# Patient Record
Sex: MALE | Race: BLACK OR AFRICAN AMERICAN | NOT HISPANIC OR LATINO | URBAN - METROPOLITAN AREA
[De-identification: names, ages, dates, MRNs, and addresses within clinical notes are randomized per-mention and may not be internally consistent; named-entity substitution may affect disease eponyms.]

---

## 2021-09-27 ENCOUNTER — EMERGENCY (EMERGENCY)
Facility: HOSPITAL | Age: 31
LOS: 1 days | Discharge: HOME | End: 2021-09-27
Attending: STUDENT IN AN ORGANIZED HEALTH CARE EDUCATION/TRAINING PROGRAM | Admitting: STUDENT IN AN ORGANIZED HEALTH CARE EDUCATION/TRAINING PROGRAM
Payer: MEDICAID

## 2021-09-27 VITALS
OXYGEN SATURATION: 97 % | DIASTOLIC BLOOD PRESSURE: 95 MMHG | RESPIRATION RATE: 18 BRPM | TEMPERATURE: 98 F | HEART RATE: 94 BPM | SYSTOLIC BLOOD PRESSURE: 136 MMHG

## 2021-09-27 VITALS
RESPIRATION RATE: 16 BRPM | TEMPERATURE: 98 F | DIASTOLIC BLOOD PRESSURE: 85 MMHG | OXYGEN SATURATION: 97 % | HEART RATE: 93 BPM | SYSTOLIC BLOOD PRESSURE: 143 MMHG

## 2021-09-27 LAB
ALBUMIN SERPL ELPH-MCNC: 4.1 G/DL — SIGNIFICANT CHANGE UP (ref 3.3–5)
ALP SERPL-CCNC: 53 U/L — SIGNIFICANT CHANGE UP (ref 40–120)
ALT FLD-CCNC: 36 U/L — SIGNIFICANT CHANGE UP (ref 4–41)
ANION GAP SERPL CALC-SCNC: 13 MMOL/L — SIGNIFICANT CHANGE UP (ref 7–14)
AST SERPL-CCNC: 21 U/L — SIGNIFICANT CHANGE UP (ref 4–40)
BASOPHILS # BLD AUTO: 0.03 K/UL — SIGNIFICANT CHANGE UP (ref 0–0.2)
BASOPHILS NFR BLD AUTO: 0.4 % — SIGNIFICANT CHANGE UP (ref 0–2)
BILIRUB SERPL-MCNC: 0.4 MG/DL — SIGNIFICANT CHANGE UP (ref 0.2–1.2)
BUN SERPL-MCNC: 7 MG/DL — SIGNIFICANT CHANGE UP (ref 7–23)
CALCIUM SERPL-MCNC: 9.2 MG/DL — SIGNIFICANT CHANGE UP (ref 8.4–10.5)
CHLORIDE SERPL-SCNC: 103 MMOL/L — SIGNIFICANT CHANGE UP (ref 98–107)
CO2 SERPL-SCNC: 25 MMOL/L — SIGNIFICANT CHANGE UP (ref 22–31)
CREAT SERPL-MCNC: 0.96 MG/DL — SIGNIFICANT CHANGE UP (ref 0.5–1.3)
EOSINOPHIL # BLD AUTO: 0.16 K/UL — SIGNIFICANT CHANGE UP (ref 0–0.5)
EOSINOPHIL NFR BLD AUTO: 2 % — SIGNIFICANT CHANGE UP (ref 0–6)
GLUCOSE SERPL-MCNC: 100 MG/DL — HIGH (ref 70–99)
HCT VFR BLD CALC: 45 % — SIGNIFICANT CHANGE UP (ref 39–50)
HGB BLD-MCNC: 15.2 G/DL — SIGNIFICANT CHANGE UP (ref 13–17)
IANC: 5.87 K/UL — SIGNIFICANT CHANGE UP (ref 1.5–8.5)
IMM GRANULOCYTES NFR BLD AUTO: 0.3 % — SIGNIFICANT CHANGE UP (ref 0–1.5)
LYMPHOCYTES # BLD AUTO: 1.32 K/UL — SIGNIFICANT CHANGE UP (ref 1–3.3)
LYMPHOCYTES # BLD AUTO: 16.5 % — SIGNIFICANT CHANGE UP (ref 13–44)
MCHC RBC-ENTMCNC: 30.3 PG — SIGNIFICANT CHANGE UP (ref 27–34)
MCHC RBC-ENTMCNC: 33.8 GM/DL — SIGNIFICANT CHANGE UP (ref 32–36)
MCV RBC AUTO: 89.8 FL — SIGNIFICANT CHANGE UP (ref 80–100)
MONOCYTES # BLD AUTO: 0.58 K/UL — SIGNIFICANT CHANGE UP (ref 0–0.9)
MONOCYTES NFR BLD AUTO: 7.3 % — SIGNIFICANT CHANGE UP (ref 2–14)
NEUTROPHILS # BLD AUTO: 5.87 K/UL — SIGNIFICANT CHANGE UP (ref 1.8–7.4)
NEUTROPHILS NFR BLD AUTO: 73.5 % — SIGNIFICANT CHANGE UP (ref 43–77)
NRBC # BLD: 0 /100 WBCS — SIGNIFICANT CHANGE UP
NRBC # FLD: 0 K/UL — SIGNIFICANT CHANGE UP
PLATELET # BLD AUTO: 223 K/UL — SIGNIFICANT CHANGE UP (ref 150–400)
POTASSIUM SERPL-MCNC: 3.4 MMOL/L — LOW (ref 3.5–5.3)
POTASSIUM SERPL-SCNC: 3.4 MMOL/L — LOW (ref 3.5–5.3)
PROT SERPL-MCNC: 7 G/DL — SIGNIFICANT CHANGE UP (ref 6–8.3)
RBC # BLD: 5.01 M/UL — SIGNIFICANT CHANGE UP (ref 4.2–5.8)
RBC # FLD: 12.8 % — SIGNIFICANT CHANGE UP (ref 10.3–14.5)
SODIUM SERPL-SCNC: 141 MMOL/L — SIGNIFICANT CHANGE UP (ref 135–145)
TROPONIN T, HIGH SENSITIVITY RESULT: <6 NG/L — SIGNIFICANT CHANGE UP
TSH SERPL-MCNC: 0.6 UIU/ML — SIGNIFICANT CHANGE UP (ref 0.27–4.2)
WBC # BLD: 7.98 K/UL — SIGNIFICANT CHANGE UP (ref 3.8–10.5)
WBC # FLD AUTO: 7.98 K/UL — SIGNIFICANT CHANGE UP (ref 3.8–10.5)

## 2021-09-27 PROCEDURE — 99285 EMERGENCY DEPT VISIT HI MDM: CPT

## 2021-09-27 PROCEDURE — 71045 X-RAY EXAM CHEST 1 VIEW: CPT | Mod: 26

## 2021-09-27 RX ORDER — POTASSIUM CHLORIDE 20 MEQ
40 PACKET (EA) ORAL ONCE
Refills: 0 | Status: COMPLETED | OUTPATIENT
Start: 2021-09-27 | End: 2021-09-27

## 2021-09-27 NOTE — ED PROVIDER NOTE - CLINICAL SUMMARY MEDICAL DECISION MAKING FREE TEXT BOX
Intermittent episodes of cp/palpitations. Well appearing. Unliekly ACS. Will get labs, ekg, xr r/o acs and eval for arrythmia. Will check tsh for thyroid dz. If w/u unremarkable will dc to fu with cards for [facility]ark.

## 2021-09-27 NOTE — ED PROVIDER NOTE - NSFOLLOWUPINSTRUCTIONS_ED_ALL_ED_FT
No signs of emergency medical condition on today's workup.  Presumptive diagnosis made, but further evaluation may be required by your primary care doctor or specialist for a definitive diagnosis.  Therefore, follow up as directed and if symptoms change/worsen or any emergency conditions, please return to the ER.     Follow up with a cardiologist as discussed in 2-3 days

## 2021-09-27 NOTE — ED PROVIDER NOTE - PROGRESS NOTE DETAILS
Pt reassessed at bedside, feels well, pain controlled. Informed of workup in ED, reviewed lab and/or radiology results (when applicable) with patient/caregiver. Informed pt of plan for discharge with instructions to follow up with PMD. Pt/caregiver expressed understanding of plan and agrees with plan for discharge. Strict return precautions discussed with patient in layman's terms, patient demonstrated understanding of return precuations.

## 2021-09-27 NOTE — ED PROVIDER NOTE - OBJECTIVE STATEMENT
31yo M hx asthma p/w 2 weeks of intermittent minutes-long episodes of L sided cp, sob, and palpitations. Denies any fever, chills, cough, leg pain/swelling/hx PE/DVT, back pain, abd pain, n/v/d, hot/cold syndrome. Denies any exertional/pleuritic sx or family hx of early onset heart dz. Had w/u at OSH which was wnl and dc'ed.

## 2021-09-27 NOTE — ED PROVIDER NOTE - PHYSICAL EXAMINATION
VITALS: Initial triage and subsequent vitals have been reviewed by me.  Gen: Well appearing, NAD, alert, non-toxic  Head: NCAT  HEENT: MMM, normal conjunctiva, anicteric, neck supple  Lung: CTAB, no adventitious sounds  CV: RRR, no murmurs, 2+symmetric peripheral pulses  Abd: soft, NTND, no rebound or guarding, no palpable masses  MSK: No edema, no visible deformities  Neuro: Moving all extremity grossly, following commands appropriately, fluid speech  Skin: Warm and dry, no evidence of rash  Psych: normal mood and affect

## 2021-09-27 NOTE — ED PROVIDER NOTE - PATIENT PORTAL LINK FT
You can access the FollowMyHealth Patient Portal offered by [facility] by registering at the following website: [email or web address]. By joining [employer]’s FollowMyHealth portal, you will also be able to view your health information using other applications (apps) compatible with our system.

## 2021-09-27 NOTE — ED ADULT TRIAGE NOTE - CHIEF COMPLAINT QUOTE
pt c/o intermittent, non radiating, non reproducible, sharp, L sided chest pain x 2 weeks. pt states pain comes at rest and while moving. pt endorses SOB associated w chest pain. pt denies fever, cough, n/v. hx of asthma. pt evaluated at [facility] ED 2 days ago w no significant findings.

## 2021-09-27 NOTE — ED PROVIDER NOTE - NS ED ROS FT
CONSTITUTIONAL: No fevers, no chills, no lightheadedness, no dizziness  EYES: no visual changes, no eye pain  EARS: no ear drainage, no ear pain, no change in hearing  NOSE: no nasal congestion  MOUTH/THROAT: no sore throat  CV: see hpi  RESP: no cough  GI: No n/v/d, no abd pain  : no dysuria, no hematuria, no flank pain  MSK: no back pain, no extremity pain  SKIN: no rashes  NEURO: no headache, no focal weakness, no decreased sensation/paresthesias   PSYCHIATRIC: no known mental health issues.

## 2021-10-11 ENCOUNTER — EMERGENCY (EMERGENCY)
Facility: HOSPITAL | Age: 31
LOS: 1 days | Discharge: HOME | End: 2021-10-11
Attending: EMERGENCY MEDICINE | Admitting: EMERGENCY MEDICINE
Payer: MEDICAID

## 2021-10-11 VITALS
DIASTOLIC BLOOD PRESSURE: 94 MMHG | HEART RATE: 96 BPM | RESPIRATION RATE: 18 BRPM | TEMPERATURE: 98 F | SYSTOLIC BLOOD PRESSURE: 134 MMHG | OXYGEN SATURATION: 100 %

## 2021-10-11 VITALS
TEMPERATURE: 99 F | RESPIRATION RATE: 19 BRPM | SYSTOLIC BLOOD PRESSURE: 128 MMHG | DIASTOLIC BLOOD PRESSURE: 80 MMHG | OXYGEN SATURATION: 98 % | HEART RATE: 90 BPM

## 2021-10-11 LAB
ANION GAP SERPL CALC-SCNC: 16 MMOL/L — HIGH (ref 7–14)
BASOPHILS # BLD AUTO: 0.05 K/UL — SIGNIFICANT CHANGE UP (ref 0–0.2)
BASOPHILS NFR BLD AUTO: 0.5 % — SIGNIFICANT CHANGE UP (ref 0–2)
BUN SERPL-MCNC: 9 MG/DL — SIGNIFICANT CHANGE UP (ref 7–23)
CALCIUM SERPL-MCNC: 9.9 MG/DL — SIGNIFICANT CHANGE UP (ref 8.4–10.5)
CHLORIDE SERPL-SCNC: 98 MMOL/L — SIGNIFICANT CHANGE UP (ref 98–107)
CO2 SERPL-SCNC: 22 MMOL/L — SIGNIFICANT CHANGE UP (ref 22–31)
CREAT SERPL-MCNC: 1.09 MG/DL — SIGNIFICANT CHANGE UP (ref 0.5–1.3)
EOSINOPHIL # BLD AUTO: 0.44 K/UL — SIGNIFICANT CHANGE UP (ref 0–0.5)
EOSINOPHIL NFR BLD AUTO: 4.5 % — SIGNIFICANT CHANGE UP (ref 0–6)
GLUCOSE SERPL-MCNC: 61 MG/DL — LOW (ref 70–99)
HCT VFR BLD CALC: 47.3 % — SIGNIFICANT CHANGE UP (ref 39–50)
HGB BLD-MCNC: 16 G/DL — SIGNIFICANT CHANGE UP (ref 13–17)
IANC: 6.68 K/UL — SIGNIFICANT CHANGE UP (ref 1.5–8.5)
IMM GRANULOCYTES NFR BLD AUTO: 0.3 % — SIGNIFICANT CHANGE UP (ref 0–1.5)
LYMPHOCYTES # BLD AUTO: 1.62 K/UL — SIGNIFICANT CHANGE UP (ref 1–3.3)
LYMPHOCYTES # BLD AUTO: 16.6 % — SIGNIFICANT CHANGE UP (ref 13–44)
MCHC RBC-ENTMCNC: 30.5 PG — SIGNIFICANT CHANGE UP (ref 27–34)
MCHC RBC-ENTMCNC: 33.8 GM/DL — SIGNIFICANT CHANGE UP (ref 32–36)
MCV RBC AUTO: 90.1 FL — SIGNIFICANT CHANGE UP (ref 80–100)
MONOCYTES # BLD AUTO: 0.92 K/UL — HIGH (ref 0–0.9)
MONOCYTES NFR BLD AUTO: 9.4 % — SIGNIFICANT CHANGE UP (ref 2–14)
NEUTROPHILS # BLD AUTO: 6.68 K/UL — SIGNIFICANT CHANGE UP (ref 1.8–7.4)
NEUTROPHILS NFR BLD AUTO: 68.7 % — SIGNIFICANT CHANGE UP (ref 43–77)
NRBC # BLD: 0 /100 WBCS — SIGNIFICANT CHANGE UP
NRBC # FLD: 0 K/UL — SIGNIFICANT CHANGE UP
PLATELET # BLD AUTO: 232 K/UL — SIGNIFICANT CHANGE UP (ref 150–400)
POTASSIUM SERPL-MCNC: 4.4 MMOL/L — SIGNIFICANT CHANGE UP (ref 3.5–5.3)
POTASSIUM SERPL-SCNC: 4.4 MMOL/L — SIGNIFICANT CHANGE UP (ref 3.5–5.3)
RBC # BLD: 5.25 M/UL — SIGNIFICANT CHANGE UP (ref 4.2–5.8)
RBC # FLD: 12.8 % — SIGNIFICANT CHANGE UP (ref 10.3–14.5)
SODIUM SERPL-SCNC: 136 MMOL/L — SIGNIFICANT CHANGE UP (ref 135–145)
TROPONIN T, HIGH SENSITIVITY RESULT: 7 NG/L — SIGNIFICANT CHANGE UP
WBC # BLD: 9.74 K/UL — SIGNIFICANT CHANGE UP (ref 3.8–10.5)
WBC # FLD AUTO: 9.74 K/UL — SIGNIFICANT CHANGE UP (ref 3.8–10.5)

## 2021-10-11 PROCEDURE — 99285 EMERGENCY DEPT VISIT HI MDM: CPT

## 2021-10-11 PROCEDURE — 71046 X-RAY EXAM CHEST 2 VIEWS: CPT | Mod: 26

## 2021-10-11 RX ORDER — IPRATROPIUM/ALBUTEROL SULFATE 18-103MCG
3 AEROSOL WITH ADAPTER (GRAM) INHALATION ONCE
Refills: 0 | Status: COMPLETED | OUTPATIENT
Start: 2021-10-11 | End: 2021-10-11

## 2021-10-11 RX ADMIN — Medication 40 MILLIGRAM(S): at 19:25

## 2021-10-11 RX ADMIN — Medication 3 MILLILITER(S): at 19:25

## 2021-10-11 NOTE — ED PROVIDER NOTE - CLINICAL SUMMARY MEDICAL DECISION MAKING FREE TEXT BOX
29 y/o male h/o asthma with cp, sob today, scan end exp wheeze.  Eval for asthma exac vs ptx vs pna vs anemia vs lyte abn.  Obtain cbc bmp trop ekg cxr give duoneb steroid reassess.

## 2021-10-11 NOTE — ED PROVIDER NOTE - OBJECTIVE STATEMENT
29 y/o male h/o asthma rhabdo here with int cp and sob today.  Took neb which helped but he is concerned about his heart.  Has appt scheduled with cards at end of month.  Denies h/o dvt/pe, leg pain/swelling, cocaine or tobacco use, fam h/o early cardiac disease.

## 2021-10-11 NOTE — ED PROVIDER NOTE - CARE PLAN
1 Principal Discharge DX:	Chest pain  Secondary Diagnosis:	Wheezes   Principal Discharge DX:	Asthma exacerbation  Assessment and plan of treatment:	You were seen today for your asthma exacerbation.  Continue to use albuterol as needed for wheezing.  Take the prescribed steroids until you feel better.  Follow up with your primary care physician in 2-3 days for re-evaluation.  RETURN TO THE EMERGENCY DEPARTMENT IMMEDIATELY IF YOU FEEL SEVERE SHORTNESS OF BREATH OR FOR ANY OTHER CONCERN.  Secondary Diagnosis:	Wheezes  Secondary Diagnosis:	Chest pain

## 2021-10-11 NOTE — ED ADULT TRIAGE NOTE - CHIEF COMPLAINT QUOTE
Pt c/o chest pain and episode of SOB earlier today, states he was treated for RHabdo last week. PMH asthma

## 2021-10-11 NOTE — ED PROVIDER NOTE - PATIENT PORTAL LINK FT
You can access the FollowMyHealth Patient Portal offered by [facility] by registering at the following website: [email or web address]. By joining [employer]’s FollowMyHealth portal, you will also be able to view your health information using other applications (apps) compatible with our system.

## 2022-09-15 NOTE — ED PROVIDER NOTE - IV ALTEPLASE EXCL REL HIDDEN
show Implemented All Universal Safety Interventions:  [place] to call system. Call bell, personal items and telephone within reach. Instruct patient to call for assistance. Room bathroom lighting operational. Non-slip footwear when patient is off stretcher. Physically safe environment: no spills, clutter or unnecessary equipment. Stretcher in lowest position, wheels locked, appropriate side rails in place.

## 2025-07-27 ENCOUNTER — EMERGENCY (EMERGENCY)
Facility: HOSPITAL | Age: 35
LOS: 1 days | End: 2025-07-27
Attending: STUDENT IN AN ORGANIZED HEALTH CARE EDUCATION/TRAINING PROGRAM | Admitting: STUDENT IN AN ORGANIZED HEALTH CARE EDUCATION/TRAINING PROGRAM
Payer: COMMERCIAL

## 2025-07-27 VITALS
HEIGHT: 68 IN | RESPIRATION RATE: 18 BRPM | DIASTOLIC BLOOD PRESSURE: 61 MMHG | HEART RATE: 90 BPM | TEMPERATURE: 97 F | WEIGHT: 300.05 LBS | OXYGEN SATURATION: 96 % | SYSTOLIC BLOOD PRESSURE: 115 MMHG

## 2025-07-27 PROCEDURE — 99284 EMERGENCY DEPT VISIT MOD MDM: CPT

## 2025-07-27 RX ORDER — NEOMYCIN/POLYMYXIN B/DEXAMETHA 3.5-10K-.1
1 OINTMENT (GRAM) OPHTHALMIC (EYE)
Qty: 1 | Refills: 0
Start: 2025-07-27 | End: 2025-08-02

## 2025-07-27 NOTE — ED PROVIDER NOTE - PATIENT PORTAL LINK FT
You can access the FollowMyHealth Patient Portal offered by [facility] by registering at the following website: [email or web address]. By joining [employer]’s FollowMyHealth portal, you will also be able to view your health information using other applications (apps) compatible with our system.

## 2025-07-27 NOTE — PROCEDURE NOTE - ADDITIONAL PROCEDURE DETAILS
Left upper eyelid margin laceratio repair Left upper eyelid margin laceration repair performed.       - Vicryl 6-0 suture used to create 4 stitches  - Patient tolerated the procedure well.

## 2025-07-27 NOTE — CONSULT NOTE ADULT - ASSESSMENT
34y male with a past medical history/ocular history of *** consulted for ***, found to have ***    Seen and discussed with ***.    Outpatient Follow-up: Patient should follow-up with his/her ophthalmologist or with [facility] Department of Ophthalmology within 1 week of after discharge at:    [address]  [place] [address]  [phone]    Oliva Xie MD, PGY-1  Also available on [employer] Teams     34y male with a past medical history/ocular history of *** consulted for ***, found to have ***      #left upper lid laceration  - Transferred from [facility]     .    Seen and discussed with ***.    Outpatient Follow-up: Patient should follow-up with his/her ophthalmologist or with [facility] Department of Ophthalmology within 1 week of after discharge at:    [address]  [place] [address]  [phone]    Oliva Xie MD, PGY-2  Also available on [employer] Teams     34y male with a past medical history/ocular history of asthma transferred from [facility] for lid laceration OS    #Upper eyelid margin laceration, left eye  - Presented to [facility] ED on 7/27/25 after falling and hitting his head on pavement and injuring the left upper eyelid.   - Found to have left upper eyelid laceration [facility]   - Reports mild periorbital pain OS but no other ocular symptoms   - On exam,       #left upper lid laceration  - Transferred from [facility]     .    Seen and discussed with ***.    Outpatient Follow-up: Patient should follow-up with his/her ophthalmologist or with [facility] Department of Ophthalmology within 1 week of after discharge at:    [address]  [place] [address]  [phone]    Oliva Xie MD, PGY-2  Also available on [employer] Teams     34y male with a past medical history/ocular history of asthma transferred from [facility] for lid laceration OS    #Upper eyelid margin laceration, left eye  - Presented to [facility] ED on 7/27/25 after falling on face causing left upper eyelid laceration. Transferred to [facility] ED for lac repair.   - CT head at [facility] unremarkable   - Reports mild periorbital pain OS but no other ocular symptoms   - On exam, BCVA 20/20 OU, no APD, IOP wnl OU, EOM full without pain OU, CVF full OU, color 12/12 OU   - Ant exam with mild periorbital swelling OS, 0.5 mm upper lid margin laceration OS, temporal subconjunctival hemorrhage OS.   - DFE wnl OU   - Consent obtained and left upper eyelid margin laceration repaired   - Start maxitrol ointment TID in the left eye  - Avoid strenuous activity, no heavy lifting   - Patient to return to ED if symptoms worsen  - Patient to follow up with [facility] within 1 week of discharge     Seen and discussed with Dr. Pizarro     Outpatient Follow-up: Patient should follow-up with his/her ophthalmologist or with [facility] Department of Oculoplastics within 1 week of after discharge at:    [address]  [place] [address]  [phone]    Oliva Xie MD, PGY-2  Also available on [employer] Teams

## 2025-07-27 NOTE — ED PROVIDER NOTE - CLINICAL SUMMARY MEDICAL DECISION MAKING FREE TEXT BOX
Patient is a 35 y/o who tripped over his foot and fell on his face causing a left eyelid laceration. It was repaired by opthalmology and patient otherwise feels fine. Ophtho recommends discharge on Maxitrol ointment and follow up with their ophthalmologist.

## 2025-07-27 NOTE — ED ADULT TRIAGE NOTE - CCCP TRG CHIEF CMPLNT
Transfer from [place]/eye pain traumatic Transfer from [facility]/eye pain traumatic

## 2025-07-27 NOTE — ED PROVIDER NOTE - OBJECTIVE STATEMENT
Patient is a 35 y/o who fell this morning at 3am after tripping over his own foot and landed on his face. He fell on his left eye and cut it open. Patient went to [facility] initially and was transferred here for ophthalmology. Patient denies headache, vision changes, dizziness, nausea, vomiting, nasal drainage, chest pain, dyspnea. Patient was seen by optho and had a laceration across his left eyelid repaired before examination.

## 2025-07-27 NOTE — ED PROVIDER NOTE - ATTENDING CONTRIBUTION TO CARE
I have discussed the patient's case presentation with resident. I have also personally performed a face-to-face evaluation of the patient. I agree with the resident's assessment and plan. My additional comments are as below:    Patient transferred from [facility] in [place] for ophthalmology evaluation and treatment for left upper eyelid laceration sustained after mechanical trip and fall. Pt denies any visual deficits due to trauma. VA 20/20 bilaterally, PERRL, EOMI. No active bleeding.    ophtho in ED to repair and give further recs.

## 2025-07-27 NOTE — ED ADULT NURSE NOTE - NSFALLRISKINTERV_ED_ALL_ED

## 2025-07-27 NOTE — CONSULT NOTE ADULT - SUBJECTIVE AND OBJECTIVE BOX
[facility] DEPARTMENT OF OPHTHALMOLOGY - INITIAL ADULT CONSULT  ----------------------------------------------------------------------------------------------------  Oliva Xie MD PGY-2  Available on teams  ----------------------------------------------------------------------------------------------------    HPI:    Patient is a 35 y/o who fell this morning at 3am after tripping over his own foot and landed on his face. He fell on his left eye and cut it open. Patient went to [facility] initially and was transferred here for ophthalmology. Patient denies headache, vision changes, dizziness, nausea, vomiting, nasal drainage, chest pain, dyspnea. Patient was seen by optho and had a laceration across his left eyelid repaired before examination.    Interval History:    mild periorbital pain OS but no blurry vision, flashes, floaters, FBS, discharge, double vision, OU    PAST MEDICAL & SURGICAL HISTORY:  Asthma        Past Ocular History: ***  Ophthalmic Medications: ***  FAMILY HISTORY:    Social History: ***    MEDICATIONS  (STANDING):    MEDICATIONS  (PRN):    Allergies & Intolerances:   No Known Allergies    Review of Systems:  Constitutional: No fever, chills  Eyes: as above   Neuro: No tremors  Cardiovascular: No chest pain, palpitations  Respiratory: No SOB, no cough  GI: No nausea, vomiting, abdominal pain  : No dysuria  Skin: no rash  Psych: no depression  Endocrine: no polyuria, polydipsia  Heme/lymph: no swelling    VITALS: T(C): 36.2 (07-27-25 @ 10:36)  T(F): 97.2 (07-27-25 @ 10:36), Max: 97.2 (07-27-25 @ 10:36)  HR: 90 (07-27-25 @ 10:36) (90 - 90)  BP: 115/61 (07-27-25 @ 10:36) (115/61 - 115/61)  RR:  (18 - 18)  SpO2:  (96% - 96%)  Wt(kg): --  General: AAO x 3, appropriate mood and affect    Ophthalmology Exam:  Visual acuity (sc): 20/20 OD, 20/20 OS  Pupils: PERRL OU, no APD  Ttono: 16 OD, 16 OS  Extraocular movements (EOMs): Full OU, no pain, no diplopia  Confrontational Visual Field (CVF): Full OU  Color Plates: 12/12 OD, 12/12 OS    Pen Light Exam (PLE)  External: Flat OU  Lids/Lashes/Lacrimal Ducts: Flat OU    Sclera/Conjunctiva: W+Q OU  Cornea: Cl OU  Anterior Chamber: D+F OU    Iris: Flat OU  Lens: Cl OU    Fundus Exam: dilated with 1% tropicamide and 2.5% phenylephrine  Approval obtained from *** at *** for dilation  Patient aware that pupils can remained dilated for at least 4-6 hours  Exam performed with 20D lens    Vitreous: wnl OU  Disc, cup/disc: sharp and pink, 0.4 OU  Macula: wnl OU  Vessels: wnl OU  Periphery: wnl OU    Labs/Imaging:  ***   [facility] DEPARTMENT OF OPHTHALMOLOGY - INITIAL ADULT CONSULT  ----------------------------------------------------------------------------------------------------  Oliva Xie MD PGY-2  Available on teams  ----------------------------------------------------------------------------------------------------    HPI:    Patient is a 35 y/o who fell this morning at 3am after tripping over his own foot and landed on his face. He fell on his left eye and cut it open. Patient went to [facility] initially and was transferred here for ophthalmology. Patient denies headache, vision changes, dizziness, nausea, vomiting, nasal drainage, chest pain, dyspnea. Patient was seen by optho and had a laceration across his left eyelid repaired before examination.    Interval History:    mild periorbital pain OS but no blurry vision, flashes, floaters, FBS, discharge, double vision, OU    PAST MEDICAL & SURGICAL HISTORY:  Asthma        Past Ocular History: none  Ophthalmic Medications: none  FAMILY HISTORY:    Social History: former smoker, ETOH use     MEDICATIONS  (STANDING):    MEDICATIONS  (PRN):    Allergies & Intolerances:   No Known Allergies    Review of Systems:  Constitutional: No fever, chills  Eyes: as above   Neuro: No tremors  Cardiovascular: No chest pain, palpitations  Respiratory: No SOB, no cough  GI: No nausea, vomiting, abdominal pain  : No dysuria  Skin: no rash  Psych: no depression  Endocrine: no polyuria, polydipsia  Heme/lymph: no swelling    VITALS: T(C): 36.2 (07-27-25 @ 10:36)  T(F): 97.2 (07-27-25 @ 10:36), Max: 97.2 (07-27-25 @ 10:36)  HR: 90 (07-27-25 @ 10:36) (90 - 90)  BP: 115/61 (07-27-25 @ 10:36) (115/61 - 115/61)  RR:  (18 - 18)  SpO2:  (96% - 96%)  Wt(kg): --  General: AAO x 3, appropriate mood and affect    Ophthalmology Exam:  Visual acuity (sc): 20/20 OD, 20/20 OS  Pupils: PERRL OU, no APD  Ttono: 19 OD, 20 OS  Extraocular movements (EOMs): Full OU, no pain, no diplopia  Confrontational Visual Field (CVF): Full OU  Color Plates: 12/12 OD, 12/12 OS    Pen Light Exam (PLE)  External: Flat OD, mild periorbital swelling OS  Lids/Lashes/Lacrimal Ducts: Flat OD, mild erythema/swelling in JORDEN/LLL, 0.5 mm upper lid margin laceration OS  Sclera/Conjunctiva: W+Q OD, temporal subconjunctival hemorrhage OS  Cornea: Cl OU  Anterior Chamber: D+F OU    Iris: Flat OU  Lens: Cl OU    Fundus Exam: dilated with 1% tropicamide and 2.5% phenylephrine  Approval obtained from primary team for dilation  Patient aware that pupils can remained dilated for at least 4-6 hours  Exam performed with 20D lens    Vitreous: wnl OU  Disc, cup/disc: sharp and pink, 0.4 OU  Macula: wnl OU  Vessels: wnl OU  Periphery: wnl OU   [facility] DEPARTMENT OF OPHTHALMOLOGY - INITIAL ADULT CONSULT  ----------------------------------------------------------------------------------------------------  Oliva Xie MD PGY-2  Available on teams  ----------------------------------------------------------------------------------------------------    HPI:    Patient is a 35 y/o who fell this morning at 3am after tripping over his own foot and landed on his face. He fell on his left eye and cut it open. Patient went to [facility] initially and was transferred here for ophthalmology. Patient denies headache, vision changes, dizziness, nausea, vomiting, nasal drainage, chest pain, dyspnea. Patient was seen by optho and had a laceration across his left eyelid repaired before examination.    Interval History:    mild periorbital pain OS but no blurry vision, flashes, floaters, FBS, discharge, double vision, OU    PAST MEDICAL & SURGICAL HISTORY:  Asthma        Past Ocular History: none  Ophthalmic Medications: none  FAMILY HISTORY:    Social History: former smoker, ETOH use     MEDICATIONS  (STANDING):    MEDICATIONS  (PRN):    Allergies & Intolerances:   No Known Allergies    Review of Systems:  Constitutional: No fever, chills  Eyes: as above   Neuro: No tremors  Cardiovascular: No chest pain, palpitations  Respiratory: No SOB, no cough  GI: No nausea, vomiting, abdominal pain  : No dysuria  Skin: no rash  Psych: no depression  Endocrine: no polyuria, polydipsia  Heme/lymph: no swelling    VITALS: T(C): 36.2 (07-27-25 @ 10:36)  T(F): 97.2 (07-27-25 @ 10:36), Max: 97.2 (07-27-25 @ 10:36)  HR: 90 (07-27-25 @ 10:36) (90 - 90)  BP: 115/61 (07-27-25 @ 10:36) (115/61 - 115/61)  RR:  (18 - 18)  SpO2:  (96% - 96%)  Wt(kg): --  General: AAO x 3, appropriate mood and affect    Ophthalmology Exam:  Visual acuity (sc): 20/20 OD, 20/20 OS  Pupils: PERRL OU, no APD  Ttono: 19 OD, 20 OS  Extraocular movements (EOMs): Full OU, no pain, no diplopia  Confrontational Visual Field (CVF): Full OU  Color Plates: 12/12 OD, 12/12 OS    Pen Light Exam (PLE)  External: Flat OD, mild periorbital swelling OS  Lids/Lashes/Lacrimal Ducts: Flat OD, mild erythema/swelling in JORDEN/LLL, 0.5 mm upper lid margin laceration OS  Sclera/Conjunctiva: W+Q OD, temporal subconjunctival hemorrhage OS  Cornea: Cl OU  Anterior Chamber: D+F OU with no hyphema   Iris: Flat OU  Lens: Cl OU    Fundus Exam: dilated with 1% tropicamide and 2.5% phenylephrine  Approval obtained from primary team for dilation  Patient aware that pupils can remained dilated for at least 4-6 hours  Exam performed with 20D lens    Vitreous: wnl OU  Disc, cup/disc: sharp and pink, 0.4 OU  Macula: wnl OU  Vessels: wnl OU  Periphery: wnl OU

## 2025-07-27 NOTE — ED PROVIDER NOTE - NSFOLLOWUPINSTRUCTIONS_ED_ALL_ED_FT
You were seen today for a left eyelid laceration that was repaired. Here are your discharge instructions:    Medications:    Maxitrol Ophthalmic Ointment: Apply a small amount to the inside of your left lower eyelid three times a day.  Activity:    Avoid strenuous activities for the next few days.  Do not rub or put pressure on your left eye.  Protect your eye from irritants such as dust, dirt, and smoke. Sunglasses may be helpful, especially outdoors.  Avoid swimming or submerging your head in water until your ophthalmologist clears you.  Wound Care:    Keep the area clean and dry.  Do not touch the stitches or wound with your fingers.  Your ophthalmologist will provide further instructions regarding suture removal, if applicable.  What to Watch For:    Increased pain or swelling around the eye.  Redness or discharge from the eye (other than a small amount of clear or slightly blood-tinged tearing).  Changes in vision, such as blurring or double vision.  Fever.  Follow-up Care:    The ophthalmologist will contact you to schedule a follow-up appointment. It is important that you keep this appointment.    If you experience any of the symptoms listed above or have any concerns, please proceed to the nearest Emergency Room if you cannot reach your doctor.  Important Notes:    Make sure you understand these instructions. Ask questions if anything is unclear.

## 2025-07-27 NOTE — ED ADULT TRIAGE NOTE - CHIEF COMPLAINT QUOTE
Patient transfer from [facility] for plastic surgery consult for left eye laceration, s/p fall this AM on pavement. A/O x 4, NAD, RR even and unlabored, no respiratory distress. denies any blurry vision or visual changes. Phx asthma Patient transfer from [facility] for plastic surgery consult for left eye laceration, s/p fall this AM on pavement. A/O x 4, NAD, RR even and unlabored, no respiratory distress. denies any blurry vision or visual changes. Phx asthma

## 2025-07-27 NOTE — ED PROVIDER NOTE - EYES, MLM
+Both pupils dilated during examination, expected after being dilated by ophthalmology. Left eye lid 1 inch laceration vertically

## 2025-07-27 NOTE — ED ADULT NURSE NOTE - CHIEF COMPLAINT QUOTE
Patient transfer from [facility] for plastic surgery consult for left eye laceration, s/p fall this AM on pavement. A/O x 4, NAD, RR even and unlabored, no respiratory distress. denies any blurry vision or visual changes. Phx asthma

## 2025-07-27 NOTE — ED ADULT NURSE NOTE - OBJECTIVE STATEMENT
This is a 33 y/o male alert and oriented x 4, transferred from [facility] for further evaluation and referral to plastic surgery. Presented due to left eye injury, s/p fall this morning, obtained laceration to left eye, denies vision changes. Left eye is red. Plastics in to see patient right away.  No other complaints noted. Plastic team in to see patient, procedure in progress.